# Patient Record
Sex: FEMALE | Race: ASIAN | URBAN - METROPOLITAN AREA
[De-identification: names, ages, dates, MRNs, and addresses within clinical notes are randomized per-mention and may not be internally consistent; named-entity substitution may affect disease eponyms.]

---

## 2020-12-10 ENCOUNTER — OFFICE VISIT (OUTPATIENT)
Dept: URBAN - METROPOLITAN AREA CLINIC 82 | Facility: CLINIC | Age: 65
End: 2020-12-10
Payer: MEDICARE

## 2020-12-10 ENCOUNTER — DASHBOARD ENCOUNTERS (OUTPATIENT)
Age: 65
End: 2020-12-10

## 2020-12-10 DIAGNOSIS — K76.89 LIVER CYST: ICD-10-CM

## 2020-12-10 PROBLEM — 85057007: Status: ACTIVE | Noted: 2020-12-10

## 2020-12-10 PROCEDURE — 99204 OFFICE O/P NEW MOD 45 MIN: CPT | Performed by: INTERNAL MEDICINE

## 2020-12-10 PROCEDURE — 99244 OFF/OP CNSLTJ NEW/EST MOD 40: CPT | Performed by: INTERNAL MEDICINE

## 2020-12-10 PROCEDURE — G8482 FLU IMMUNIZE ORDER/ADMIN: HCPCS | Performed by: INTERNAL MEDICINE

## 2020-12-10 NOTE — HPI-TODAY'S VISIT:
The patient was referred by Dr. David Madrid for abnormal US .   A copy of this document is being forwarded to the referring provider. The patient is a 65-year-old female with no previous past medical history who presented after an abnormal ultrasound.  She had routine ultrasound done and was told that she had abnormalities in her liver but she did not know what the abnormalities were.  She also has been seen by urology for kidney stones and recently had a CT urogram that was done.  Otherwise she denies any personal or family history of liver disease or liver cancer.